# Patient Record
Sex: FEMALE | Race: WHITE
[De-identification: names, ages, dates, MRNs, and addresses within clinical notes are randomized per-mention and may not be internally consistent; named-entity substitution may affect disease eponyms.]

---

## 2018-04-25 ENCOUNTER — HOSPITAL ENCOUNTER (EMERGENCY)
Dept: HOSPITAL 25 - UCCORT | Age: 33
Discharge: HOME | End: 2018-04-25
Payer: COMMERCIAL

## 2018-04-25 VITALS — SYSTOLIC BLOOD PRESSURE: 111 MMHG | DIASTOLIC BLOOD PRESSURE: 74 MMHG

## 2018-04-25 DIAGNOSIS — J02.0: Primary | ICD-10-CM

## 2018-04-25 PROCEDURE — 99202 OFFICE O/P NEW SF 15 MIN: CPT

## 2018-04-25 PROCEDURE — G0463 HOSPITAL OUTPT CLINIC VISIT: HCPCS

## 2018-04-25 PROCEDURE — 87651 STREP A DNA AMP PROBE: CPT

## 2018-04-25 PROCEDURE — 87502 INFLUENZA DNA AMP PROBE: CPT

## 2018-04-25 NOTE — UC
Throat Pain/Nasal Kan HPI





- HPI Summary


HPI Summary: 





Pt presents with fever, sore throat, and body aches since yesterday. She works 

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 Has been taking ibuprofen for her 

fever and discomfort. Denies sinus symptoms, cough, SOB, chest pain, abdominal 

pain, n/v/d/c.





- History of Current Complaint


Chief Complaint: UCGeneralIllness


Stated Complaint: SORE THROAT, FEVER, CHILLS


Time Seen by Provider: 04/25/18 17:22


Hx Obtained From: Patient


Hx Last Menstrual Period: 04/15/18


Onset/Duration: Sudden Onset


Severity: Severe


Pain Intensity: 9


Pain Scale Used: 0-10 Numeric





- Allergies/Home Medications


Allergies/Adverse Reactions: 


 Allergies











Allergy/AdvReac Type Severity Reaction Status Date / Time


 


No Known Allergies Allergy   Verified 08/12/13 11:13











Home Medications: 


 Home Medications





Ibuprofen 400 mg PO Q8HR 04/25/18 [History Confirmed 04/25/18]


Norgestimate-Ethinyl Estradiol [Ortho-Cyclen 28 Tablet] 1 each PO DAILY 04/25/ 18 [History Confirmed 04/25/18]


Venlafaxine TAB (NF) [Effexor TAB (NF)] 50 mg PO DAILY 04/25/18 [History 

Confirmed 04/25/18]











PMH/Surg Hx/FS Hx/Imm Hx


Previously Healthy: Yes


Psychological History: Anxiety





- Surgical History


Surgical History: Yes


Surgery Procedure, Year, and Place: 1991 t/a





- Family History


Known Family History: Positive: None





- Social History


Occupation: Employed Full-time


Lives: With Family


Alcohol Use: Occasionally


Substance Use Type: None


Smoking Status (MU): Never Smoked Tobacco





- Immunization History


Most Recent Tetanus Shot: unknown





Review of Systems


Constitutional: Fever


Skin: Negative


Eyes: Negative


ENT: Sore Throat


Respiratory: Negative


Cardiovascular: Negative


Gastrointestinal: Negative


Neurovascular: Negative


Musculoskeletal: Negative


Neurological: Negative


Psychological: Negative


All Other Systems Reviewed And Are Negative: Yes





Physical Exam





- Summary


Physical Exam Summary: 





GENERAL: NAD. WDWN. No pain distress.


SKIN: No rashes, sores, ulcers, masses, lesions.


HEENT:


            Head: AT/NC


            Eyes: Conjunctiva clear without inflammation or discharge.


            Ears: Hearing grossly normal. TMs intact, no bulging, erythema, or 

edema. 


            Nose: Nasal mucosa pink and moist. NTTP maxillary and frontal 

sinus. 


            Throat: Posterior oropharynx moderate erythema and 2+ tonsillar 

enlargement. No exudates. Uvula midline. No hoarse voice or muffled voice.


NECK: Supple. TTP anterior LAD.


CHEST:  CTAB. No r/r/w. No accessory muscle use. Breathing comfortably and in 

no distress.


CV:  RRR. Without m/r/g. Pulses intact. Brisk cap refill.


NEURO: Alert. CN II-XII grossly intact.


PSYCH: Age appropriate behavior.


Triage Information Reviewed: Yes


Vital Signs: 


 Initial Vital Signs











Temp  101.1 F   04/25/18 17:08


 


Pulse  114   04/25/18 17:08


 


Resp  20   04/25/18 17:08


 


BP  111/74   04/25/18 17:08


 


Pulse Ox  100   04/25/18 17:08














Throat Pain/Nasal Course/Dx





- Course


Course Of Treatment: POC strep positive.  POC flu negative.  Amoxicillin





- Differential Dx/Diagnosis


Provider Diagnoses: Strep pharyngitis





Discharge





- Sign-Out/Discharge


Documenting (check all that apply): Discharge/Admit/Transfer





- Discharge Plan


Condition: Stable


Disposition: HOME


Prescriptions: 


Amoxicillin PO (*) [Amoxicillin 500 MG CAP*] 500 mg PO Q12H #20 cap


Patient Education Materials:  Strep Throat (DC)


Referrals: 


Bill Duckworth MD [Primary Care Provider] - 


Additional Instructions: 


If you develop a fever, shortness of breath, chest pain, new or worsening 

symptoms - please call your PCP or go to the ED.


 








- Billing Disposition and Condition


Condition: STABLE


Disposition: HOME

## 2018-08-24 ENCOUNTER — HOSPITAL ENCOUNTER (EMERGENCY)
Dept: HOSPITAL 25 - UCCORT | Age: 33
Discharge: HOME | End: 2018-08-24
Payer: COMMERCIAL

## 2018-08-24 VITALS — DIASTOLIC BLOOD PRESSURE: 83 MMHG | SYSTOLIC BLOOD PRESSURE: 128 MMHG

## 2018-08-24 DIAGNOSIS — H65.92: Primary | ICD-10-CM

## 2018-08-24 PROCEDURE — G0463 HOSPITAL OUTPT CLINIC VISIT: HCPCS

## 2018-08-24 PROCEDURE — 99212 OFFICE O/P EST SF 10 MIN: CPT
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
## 2018-10-23 ENCOUNTER — HOSPITAL ENCOUNTER (EMERGENCY)
Dept: HOSPITAL 25 - UCCORT | Age: 33
Discharge: HOME | End: 2018-10-23
Payer: COMMERCIAL

## 2018-10-23 VITALS — SYSTOLIC BLOOD PRESSURE: 119 MMHG | DIASTOLIC BLOOD PRESSURE: 82 MMHG
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
## 2018-10-23 DIAGNOSIS — S82.832A: Primary | ICD-10-CM
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
## 2018-10-23 PROCEDURE — G0463 HOSPITAL OUTPT CLINIC VISIT: HCPCS

## 2018-10-23 PROCEDURE — 99212 OFFICE O/P EST SF 10 MIN: CPT
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
## 2018-10-23 NOTE — XMS REPORT
Yury Tiwari

 Created on:2018



Patient:Yury Tiwari

Sex:Female

:1985

External Reference #:[id number]





Demographics







 Address  [address] [place] [address]

 

 Home Phone  [phone]

 

 Mobile Phone  [phone]

 

 Preferred Language  English

 

 Marital Status  Not  Or 

 

 Anglican Affiliation  Unknown

 

 Race  White

 

 Ethnic Group  Not  Or 









Author







 Organization  [facility]

 

 Address  [address], [address] [place] [address]

 

 Phone  [phone]









Support







 Name  Relationship  Address  Phone

 

 Niranjan Silvestre    [address] [facility]  [phone]



     [place] [address]  









Care Team Providers







 Name  Role  Phone

 

 Bill Duckworth MD  Care Team Information   Unavailable

 

 Bill Duckworth MD  Primary Care Physician  Unavailable









Payers







 Type  Date  Identification Numbers  Payment Provider  Subscriber

 

 Commercial    Policy Number: [id number]  Félix Tiwari









 PayID: [id number]  [address] [address]









 [place] [address]







Problems







 Date  Description  Provider  Status

 

 Onset: 2018  Closed fracture of lateral malleolus  Roseanne Ward PA  
Active







Family History







 Date  Family Member(s)  Problem(s)  Comments

 

   General  Lung Cancer  

 

   General  Heart Attack  

 

   General  Heart Disease  

 

   Father  Alive  

 

   Mother  Alive  

 

   Mother  cataract  

 

   Paternal Grandfather  Alive  

 

   Paternal Grandmother  Alive  

 

   Maternal Grandfather   due to Unknown Causes  ()

 

   Maternal Grandmother  Lung Cancer  

 

   Maternal Grandmother   due to Lung Cancer  ()







Social History







 Type  Date  Description  Comments

 

 Lives With      Niranjan Silvestre

 

 Occupation      [employer]

 

 Work Status    Employed Full Time  

 

 Cigarette Use    Never Smoked Cigarettes  

 

 ETOH Use    Currently consumes alcohol socially  

 

 Recreational Drug Use    Denies Drug Use  

 

 Smoking    Patient has never smoked  

 

 Exercise Type/Frequency    Does not exercise  







Allergies, Adverse Reactions, Alerts







 Date  Description  Reaction  Status  Severity  Comments

 

 2018  NKDA    active    







Medications







 Medication  Date  Status  Form  Strength  Qnty  SIG  Indications  Ordering



                 Provider

 

 Venlafaxine HCL    Active  Caps ER  150mg        Gucci Bill



 ER  0    24HR          MD ISAIAH

 

 Venlafaxine HCL    Active  Caps ER  37.5mg        Risnayan, Bill



 ER  0    24HR          MD ISAIAH







Vital Signs







 Date  Vital  Result  Comment

 

 2018  BP Systolic Sitting Right Arm  114 mmHg  









 BP Diastolic Sitting Right Arm  77 mmHg  

 

 Body Temperature  98.0 F  

 

 Heart Rate  81 /min  

 

 Respiratory Rate  18 /min  

 

 Height  63 inches  5'3"

 

 Weight  154.50 lb  

 

 BMI (Body Mass Index)  27.4 kg/m2  

 

 BSA (Body Surface Area)  1.73 m2  

 

 Ideal body weight in kilograms  52  

 

 O2 % BldC Oximetry  96 %  









 2018  BP Systolic  117 mmHg  









 BP Diastolic  75 mmHg  

 

 Body Temperature  98.2 F  

 

 Heart Rate  84 /min  

 

 Height  63 inches  5'3"

 

 Weight  149.00 lb  

 

 BMI (Body Mass Index)  26.4 kg/m2  

 

 BSA (Body Surface Area)  1.71 m2  

 

 Ideal body weight in kilograms  52  

 

 O2 % BldC Oximetry  95 %  









 2018  BP Systolic  143 mmHg  









 BP Diastolic  81 mmHg  

 

 Body Temperature  98.5 F  

 

 Heart Rate  82 /min  

 

 Height  63 inches  5'3"

 

 Weight  143.00 lb  

 

 BMI (Body Mass Index)  25.3 kg/m2  

 

 BSA (Body Surface Area)  1.68 m2  

 

 Ideal body weight in kilograms  52  

 

 O2 % BldC Oximetry  99 %  

 

 Pain Level  0  







Results







 Description

 

 No Information







Procedures







 Date  CPT Code  Description  Status

 

 2018  37833  Radiology, Ankle Complete  Completed

 

 2018  82924  Radiology, Ankle Complete  Completed

 

 2018  85668  Fracture closed distal fib w/o manipulation  Completed







Encounters







 Type  Date  Location  Provider  CPT E/M  Dx

 

 Office Visit  2018  2:15p  Orthopaedic Office  Roseanne Ward PA  [address]
  S82.65xA







Plan of Care

Future Appointment(s):10/08/2018  2:30 pm - Roseanne Ward PA at Orthopaedic 
[id number] - Roseanne Ward PAS82.65xA Nondisp fx of lateral malleolus 
of left fibula, initComments:Patient will continue in the Cam walker, she is 
using a IWalk crutch and doing well.  Recheck back in 2 weeks with repeat xrays.

## 2018-10-23 NOTE — XMS REPORT
Yury Tiwari

 Created on:2018



Patient:Yury Tiwari

Sex:Female

:1985

External Reference #:[id number]





Demographics







 Address  [address] [place] [address]

 

 Home Phone  [phone]

 

 Mobile Phone  [phone]

 

 Preferred Language  English

 

 Marital Status  Not  Or 

 

 Voodoo Affiliation  Unknown

 

 Race  White

 

 Ethnic Group  Not  Or 









Author







 Organization  [facility]

 

 Address  [address], [address] [place] [address]

 

 Phone  [phone]









Support







 Name  Relationship  Address  Phone

 

 Niranjan Silvestre    [address] [facility]  [phone]



     [place] [address]  









Care Team Providers







 Name  Role  Phone

 

 Bill Duckworth MD  Care Team Information   Unavailable

 

 Bill Duckworth MD  Primary Care Physician  Unavailable









Payers







 Type  Date  Identification Numbers  Payment Provider  Subscriber

 

 Commercial    Policy Number: [id number]  Félix Tiwari









 PayID: [id number]  [address] [address]









 [place] [address]







Problems







 Date  Description  Provider  Status

 

 Onset: 2018  Closed fracture of lateral malleolus  Roseanne Ward PA  
Active







Family History







 Date  Family Member(s)  Problem(s)  Comments

 

   General  Lung Cancer  

 

   General  Heart Attack  

 

   General  Heart Disease  

 

   Father  Alive  

 

   Mother  Alive  

 

   Mother  cataract  

 

   Paternal Grandfather  Alive  

 

   Paternal Grandmother  Alive  

 

   Maternal Grandfather   due to Unknown Causes  ()

 

   Maternal Grandmother  Lung Cancer  

 

   Maternal Grandmother   due to Lung Cancer  ()







Social History







 Type  Date  Description  Comments

 

 Lives With      Niranjan Silvestre

 

 Occupation      [employer]

 

 Work Status    Employed Full Time  

 

 Cigarette Use    Never Smoked Cigarettes  

 

 ETOH Use    Currently consumes alcohol socially  

 

 Recreational Drug Use    Denies Drug Use  

 

 Smoking    Patient has never smoked  

 

 Exercise Type/Frequency    Does not exercise  







Allergies, Adverse Reactions, Alerts







 Date  Description  Reaction  Status  Severity  Comments

 

 2018  NKDA    active    







Medications







 Medication  Date  Status  Form  Strength  Qnty  SIG  Indications  Ordering



                 Provider

 

 Venlafaxine HCL    Active  Caps ER  150mg        Gucci Bill



 ER  0    24HR          MD ISAIAH

 

 Venlafaxine HCL    Active  Caps ER  37.5mg        Risnayan, Bill



 ER  0    24HR          MD ISAIAH







Vital Signs







 Date  Vital  Result  Comment

 

 2018  BP Systolic Sitting Right Arm  114 mmHg  









 BP Diastolic Sitting Right Arm  77 mmHg  

 

 Body Temperature  98.0 F  

 

 Heart Rate  81 /min  

 

 Respiratory Rate  18 /min  

 

 Height  63 inches  5'3"

 

 Weight  154.50 lb  

 

 BMI (Body Mass Index)  27.4 kg/m2  

 

 BSA (Body Surface Area)  1.73 m2  

 

 Ideal body weight in kilograms  52  

 

 O2 % BldC Oximetry  96 %  









 2018  BP Systolic  117 mmHg  









 BP Diastolic  75 mmHg  

 

 Body Temperature  98.2 F  

 

 Heart Rate  84 /min  

 

 Height  63 inches  5'3"

 

 Weight  149.00 lb  

 

 BMI (Body Mass Index)  26.4 kg/m2  

 

 BSA (Body Surface Area)  1.71 m2  

 

 Ideal body weight in kilograms  52  

 

 O2 % BldC Oximetry  95 %  









 2018  BP Systolic  143 mmHg  









 BP Diastolic  81 mmHg  

 

 Body Temperature  98.5 F  

 

 Heart Rate  82 /min  

 

 Height  63 inches  5'3"

 

 Weight  143.00 lb  

 

 BMI (Body Mass Index)  25.3 kg/m2  

 

 BSA (Body Surface Area)  1.68 m2  

 

 Ideal body weight in kilograms  52  

 

 O2 % BldC Oximetry  99 %  

 

 Pain Level  0  







Results







 Description

 

 No Information







Procedures







 Date  CPT Code  Description  Status

 

 2018  05014  Radiology, Ankle Complete  Completed

 

 2018  07596  Radiology, Ankle Complete  Completed

 

 2018  74942  Fracture closed distal fib w/o manipulation  Completed







Encounters







 Type  Date  Location  Provider  CPT E/M  Dx

 

 Office Visit  2018  2:15p  Orthopaedic Office  Roseanne Ward PA  [address]
  S82.65xA







Plan of Care

Future Appointment(s):10/08/2018  2:30 pm - Roseanne Ward PA at Orthopaedic 
[id number] - Roseanne Ward PAS82.65xA Nondisp fx of lateral malleolus 
of left fibula, init

## 2018-10-23 NOTE — XMS REPORT
Yury Tiwari

 Created on:October 10, 2018



Patient:Yury Tiwari

Sex:Female

:1985

External Reference #:[id number]





Demographics







 Address  [address] [place] [address]

 

 Home Phone  [phone]

 

 Mobile Phone  [phone]

 

 Email Address  [email or web address]

 

 Preferred Language  English

 

 Marital Status  Not  Or 

 

 Anglican Affiliation  Unknown

 

 Race  White

 

 Ethnic Group  Not  Or 









Author







 Organization  [facility]

 

 Address  [address], [address] [place] [address]

 

 Phone  [phone]









Support







 Name  Relationship  Address  Phone

 

 Niranjan Silvestre    [address] [facility] [facility]  [phone]



     [place] [address]  









Care Team Providers







 Name  Role  Phone

 

 Bill Duckworth MD  Care Team Information   Unavailable

 

 Bill Duckworth MD  Primary Care Physician  Unavailable









Payers







 Type  Date  Identification Numbers  Payment Provider  Subscriber

 

 Commercial    Policy Number: [id number]  Félix Tiwari









 PayID: [id number]  [address] [address]









 [place] [address]







Problems







 Date  Description  Provider  Status

 

 Onset: 2018  Closed fracture of lateral malleolus  Roseanne Ward PA  
Active







Family History







 Date  Family Member(s)  Problem(s)  Comments

 

   General  Lung Cancer  

 

   General  Heart Attack  

 

   General  Heart Disease  

 

   Father  Alive  

 

   Mother  Alive  

 

   Mother  cataract  

 

   Paternal Grandfather  Alive  

 

   Paternal Grandmother  Alive  

 

   Maternal Grandfather   due to Unknown Causes  ()

 

   Maternal Grandmother  Lung Cancer  

 

   Maternal Grandmother   due to Lung Cancer  ()







Social History







 Type  Date  Description  Comments

 

 Lives With      Niranjan Silvestre

 

 Occupation      [employer]

 

 Work Status    Employed Full Time  

 

 Cigarette Use    Never Smoked Cigarettes  

 

 ETOH Use    Currently consumes alcohol socially  

 

 Recreational Drug Use    Denies Drug Use  

 

 Smoking    Patient has never smoked  

 

 Exercise Type/Frequency    Does not exercise  







Allergies, Adverse Reactions, Alerts







 Date  Description  Reaction  Status  Severity  Comments

 

 2018  NKDA    active    







Medications







 Medication  Date  Status  Form  Strength  Qnty  SIG  Indications  Ordering



                 Provider

 

 Venlafaxine HCL    Active  Caps ER  150mg  30caps  1 by    Sbff,



 ER  00    24HR      mouth    Bill COLON MD



             every    



             day    

 

 Venlafaxine HCL    Active  Caps ER  37.5mg    1 by    Ristoff,



 ER  00    24HR      mouth    Bill COLON MD



             every    



             day    







Vital Signs







 Date  Vital  Result  Comment

 

 10/08/2018  BP Systolic Sitting Right Arm  118 mmHg  









 BP Diastolic Sitting Right Arm  78 mmHg  

 

 Body Temperature  98.0 F  

 

 Heart Rate  79 /min  

 

 Respiratory Rate  16 /min  

 

 Height  63 inches  5'3"

 

 Weight  155.00 lb  

 

 BMI (Body Mass Index)  27.5 kg/m2  

 

 BSA (Body Surface Area)  1.74 m2  

 

 Ideal body weight in kilograms  52  

 

 O2 % BldC Oximetry  97 %  









 2018  BP Systolic Sitting Right Arm  114 mmHg  









 BP Diastolic Sitting Right Arm  77 mmHg  

 

 Body Temperature  98.0 F  

 

 Heart Rate  81 /min  

 

 Respiratory Rate  18 /min  

 

 Height  63 inches  5'3"

 

 Weight  154.50 lb  

 

 BMI (Body Mass Index)  27.4 kg/m2  

 

 BSA (Body Surface Area)  1.73 m2  

 

 Ideal body weight in kilograms  52  

 

 O2 % BldC Oximetry  96 %  









 2018  BP Systolic  117 mmHg  









 BP Diastolic  75 mmHg  

 

 Body Temperature  98.2 F  

 

 Heart Rate  84 /min  

 

 Height  63 inches  5'3"

 

 Weight  149.00 lb  

 

 BMI (Body Mass Index)  26.4 kg/m2  

 

 BSA (Body Surface Area)  1.71 m2  

 

 Ideal body weight in kilograms  52  

 

 O2 % BldC Oximetry  95 %  









 2018  BP Systolic  143 mmHg  









 BP Diastolic  81 mmHg  

 

 Body Temperature  98.5 F  

 

 Heart Rate  82 /min  

 

 Height  63 inches  5'3"

 

 Weight  143.00 lb  

 

 BMI (Body Mass Index)  25.3 kg/m2  

 

 BSA (Body Surface Area)  1.68 m2  

 

 Ideal body weight in kilograms  52  

 

 O2 % BldC Oximetry  99 %  

 

 Pain Level  0  







Results







 Description

 

 No Information







Procedures







 Date  CPT Code  Description  Status

 

 10/08/2018  71210  Radiology, Ankle Complete  Completed

 

 2018  92074  Radiology, Ankle Complete  Completed

 

 2018  04045  Radiology, Ankle Complete  Completed

 

 2018  26206  Fracture closed distal fib w/o manipulation  Completed







Encounters







 Type  Date  Location  Provider  CPT E/M  Dx

 

 Office Visit  2018  2:15p  [facility]  Roseanne Ward [place]  [address]
  S82.65xA







Plan of Care

10/08/2018 - Roseanne Ward PAS82.65xA Nondisp fx of lateral malleolus of left 
fibula, initComments:At this point I recommended continued Cam Walker use but 
she may bear weight as tolerated.  I do recommend range of motion so she can 
take it off several times a day to work on that.  Ice and elevate.Return for 
recheck in 2-3 weeks.AllFollow up:2wk

## 2018-10-23 NOTE — XMS REPORT
Yury Tiwari

 Created on:2018



Patient:Yury Tiwari

Sex:Female

:1985

External Reference #:[id number]





Demographics







 Address  [address] [place] [address]

 

 Home Phone  [phone]

 

 Mobile Phone  [phone]

 

 Preferred Language  English

 

 Marital Status  Not  Or 

 

 Mormon Affiliation  Unknown

 

 Race  White

 

 Ethnic Group  Not  Or 









Author







 Organization  [facility] include 234 beds at the [facility]

 

 Address  [address], [address] [place] [address]

 

 Phone  [phone]









Support







 Name  Relationship  Address  Phone

 

 Niranjan Silvestre    [address] [facility]  [phone]



     [place] [address]  









Care Team Providers







 Name  Role  Phone

 

 Bill Duckworth MD  Care Team Information   Unavailable

 

 Bill Duckworth MD  Primary Care Physician  Unavailable









Payers







 Type  Date  Identification Numbers  Payment Provider  Subscriber

 

 Commercial    Policy Number: [id number]  Félix Tiwari









 PayID: [id number]  [address] [address]









 [place] [address]







Problems







 Date  Description  Provider  Status

 

 Onset: 2018  Closed fracture of lateral malleolus  Roseanne Ward PA  
Active







Family History







 Date  Family Member(s)  Problem(s)  Comments

 

   General  Lung Cancer  

 

   General  Heart Attack  

 

   General  Heart Disease  

 

   Father  Alive  

 

   Mother  Alive  

 

   Mother  cataract  

 

   Paternal Grandfather  Alive  

 

   Paternal Grandmother  Alive  

 

   Maternal Grandfather   due to Unknown Causes  ()

 

   Maternal Grandmother  Lung Cancer  

 

   Maternal Grandmother   due to Lung Cancer  ()







Social History







 Type  Date  Description  Comments

 

 Lives With      Niranjan Silvestre

 

 Occupation      [employer]

 

 Work Status    Employed Full Time  

 

 Cigarette Use    Never Smoked Cigarettes  

 

 ETOH Use    Currently consumes alcohol socially  

 

 Recreational Drug Use    Denies Drug Use  

 

 Smoking    Patient has never smoked  

 

 Exercise Type/Frequency    Does not exercise  







Allergies, Adverse Reactions, Alerts







 Date  Description  Reaction  Status  Severity  Comments

 

 2018  NKDA    active    







Medications







 Medication  Date  Status  Form  Strength  Qnty  SIG  Indications  Ordering



                 Provider

 

 Venlafaxine HCL    Active  Caps ER  150mg        Gucci Bill



 ER  0    24HR          MD ISAIAH

 

 Venlafaxine HCL    Active  Caps ER  37.5mg        Risnayan, Bill



 ER  0    24HR          MD ISAIAH







Vital Signs







 Date  Vital  Result  Comment

 

 2018  BP Systolic Sitting Right Arm  114 mmHg  









 BP Diastolic Sitting Right Arm  77 mmHg  

 

 Body Temperature  98.0 F  

 

 Heart Rate  81 /min  

 

 Respiratory Rate  18 /min  

 

 Height  63 inches  5'3"

 

 Weight  154.50 lb  

 

 BMI (Body Mass Index)  27.4 kg/m2  

 

 BSA (Body Surface Area)  1.73 m2  

 

 Ideal body weight in kilograms  52  

 

 O2 % BldC Oximetry  96 %  









 2018  BP Systolic  117 mmHg  









 BP Diastolic  75 mmHg  

 

 Body Temperature  98.2 F  

 

 Heart Rate  84 /min  

 

 Height  63 inches  5'3"

 

 Weight  149.00 lb  

 

 BMI (Body Mass Index)  26.4 kg/m2  

 

 BSA (Body Surface Area)  1.71 m2  

 

 Ideal body weight in kilograms  52  

 

 O2 % BldC Oximetry  95 %  









 2018  BP Systolic  143 mmHg  









 BP Diastolic  81 mmHg  

 

 Body Temperature  98.5 F  

 

 Heart Rate  82 /min  

 

 Height  63 inches  5'3"

 

 Weight  143.00 lb  

 

 BMI (Body Mass Index)  25.3 kg/m2  

 

 BSA (Body Surface Area)  1.68 m2  

 

 Ideal body weight in kilograms  52  

 

 O2 % BldC Oximetry  99 %  

 

 Pain Level  0  







Results







 Description

 

 No Information







Procedures







 Date  CPT Code  Description  Status

 

 2018  10031  Radiology, Ankle Complete  Completed

 

 2018  22504  Radiology, Ankle Complete  Completed

 

 2018  13735  Fracture closed distal fib w/o manipulation  Completed







Encounters







 Type  Date  Location  Provider  CPT E/M  Dx

 

 Office Visit  2018  2:15p  [facility]  Roseanne Ward [place]  [address]
  S82.65xA







Plan of Care

Future Appointment(s):10/08/2018  2:30 pm - Roseanne Ward PA at [facility] 
[id number] - Roseanne Ward PAS82.65xA Nondisp fx of lateral malleolus 
of left fibula, init

## 2018-10-24 NOTE — UC
- Progress Note


Progress Note: 





X-ray reading by radiologist of October 23, 2018 ankle x-ray of the left ankle 

was read as a fibular.  This is the same reading as the provider on October 23, 2018.





Treatment was initiated based on this is diagnosis so there are no 

discrepancies.





Discharge





- Sign-Out/Discharge


Documenting (check all that apply): Patient Departure


All imaging exams completed and their final reports reviewed: Yes





- Discharge Plan


Condition: Stable


Disposition: HOME


Patient Education Materials:  Ankle Fracture (DC), Splint Care (ED)


Referrals: 


Bobby Cabrera MD [Medical Doctor] - 1 Day


Additional Instructions: 


KEEP SPLINT IN PLACE AT ALL TIMES.


DO NO PUT WEIGHT ON THE LEFT LEG.





- Billing Disposition and Condition


Condition: STABLE


Disposition: Home

## 2018-10-24 NOTE — RAD
INDICATION:  Left ankle injury.



TECHNIQUE: 3 views of the left ankle were obtained.



FINDINGS:  There is diffuse soft tissue swelling present. There is oblique intra-articular

fracture of the distal fibula. The distal fragment is slightly displaced lateral

approximately 1 cortical diameter



IMPRESSION:  SLIGHTLY DISPLACED OBLIQUE INTRA-ARTICULAR FRACTURE OF THE DISTAL FIBULA.



R0